# Patient Record
Sex: FEMALE | Race: WHITE | NOT HISPANIC OR LATINO | Employment: FULL TIME | URBAN - NONMETROPOLITAN AREA
[De-identification: names, ages, dates, MRNs, and addresses within clinical notes are randomized per-mention and may not be internally consistent; named-entity substitution may affect disease eponyms.]

---

## 2018-06-21 ENCOUNTER — TELEPHONE (OUTPATIENT)
Dept: CARDIOLOGY | Facility: CLINIC | Age: 38
End: 2018-06-21

## 2018-06-21 NOTE — TELEPHONE ENCOUNTER
Patient called and said that she has not taken coreg in 2 years. She has been having PVC's. She was last seen in our office on 09/22/15. She says that she does not drink a lot of caffeine but does drink unsweet tea. She was told that unsweet tea does still have a lot of caffeine. She is wondering if you have any recommendations or if she needs to make an appointment?

## 2018-06-25 RX ORDER — CARVEDILOL 3.12 MG/1
3.12 TABLET ORAL DAILY
Qty: 30 TABLET | Refills: 0 | Status: SHIPPED | OUTPATIENT
Start: 2018-06-25 | End: 2018-07-22 | Stop reason: SDUPTHER

## 2018-06-25 NOTE — TELEPHONE ENCOUNTER
Patient was made aware of restarting coreg 3.125 mg QD per office note from 09/22/15 in chart under media per Dr. Cronin. Script was sent to [facility] in [place] with 30 tablets and 0 refills. Patient has appointment with Mellisa on 07/02/18.

## 2018-07-02 ENCOUNTER — OFFICE VISIT (OUTPATIENT)
Dept: CARDIOLOGY | Facility: CLINIC | Age: 38
End: 2018-07-02

## 2018-07-02 VITALS
HEIGHT: 69 IN | BODY MASS INDEX: 29.47 KG/M2 | HEART RATE: 64 BPM | DIASTOLIC BLOOD PRESSURE: 62 MMHG | SYSTOLIC BLOOD PRESSURE: 98 MMHG | WEIGHT: 199 LBS

## 2018-07-02 DIAGNOSIS — Z79.899 MEDICATION MANAGEMENT: ICD-10-CM

## 2018-07-02 DIAGNOSIS — R01.2 LOUD S2 (SECOND HEART SOUND): ICD-10-CM

## 2018-07-02 DIAGNOSIS — M25.561 ARTHRALGIA OF BOTH KNEES: ICD-10-CM

## 2018-07-02 DIAGNOSIS — Z13.220 SCREENING CHOLESTEROL LEVEL: ICD-10-CM

## 2018-07-02 DIAGNOSIS — M25.562 ARTHRALGIA OF BOTH KNEES: ICD-10-CM

## 2018-07-02 DIAGNOSIS — M79.10 MYALGIA: ICD-10-CM

## 2018-07-02 DIAGNOSIS — R00.2 PALPITATIONS: Primary | ICD-10-CM

## 2018-07-02 DIAGNOSIS — R53.83 OTHER FATIGUE: ICD-10-CM

## 2018-07-02 DIAGNOSIS — R06.02 SHORTNESS OF BREATH: ICD-10-CM

## 2018-07-02 DIAGNOSIS — E66.3 OVERWEIGHT (BMI 25.0-29.9): ICD-10-CM

## 2018-07-02 DIAGNOSIS — Z82.49 FAMILY HISTORY OF ISCHEMIC HEART DISEASE: ICD-10-CM

## 2018-07-02 PROCEDURE — 99214 OFFICE O/P EST MOD 30 MIN: CPT | Performed by: NURSE PRACTITIONER

## 2018-07-02 RX ORDER — LEVONORGESTREL AND ETHINYL ESTRADIOL 0.1-0.02MG
1 KIT ORAL DAILY
COMMUNITY
End: 2022-01-11 | Stop reason: ALTCHOICE

## 2018-07-02 NOTE — PATIENT INSTRUCTIONS
Palpitations  A palpitation is the feeling that your heartbeat is irregular or is faster than normal. It may feel like your heart is fluttering or skipping a beat. Palpitations are usually not a serious problem. They may be caused by many things, including smoking, caffeine, alcohol, stress, and certain medicines. Although most causes of palpitations are not serious, palpitations can be a sign of a serious medical problem. In some cases, you may need further medical evaluation.  Follow these instructions at home:  Pay attention to any changes in your symptoms. Take these actions to help with your condition:  · Avoid the following:  ? Caffeinated coffee, tea, soft drinks, diet pills, and energy drinks.  ? Chocolate.  ? Alcohol.  · Do not use any tobacco products, such as cigarettes, chewing tobacco, and e-cigarettes. If you need help quitting, ask your health care provider.  · Try to reduce your stress and anxiety. Things that can help you relax include:  ? Yoga.  ? Meditation.  ? Physical activity, such as swimming, jogging, or walking.  ? Biofeedback. This is a method that helps you learn to use your mind to control things in your body, such as your heartbeats.  · Get plenty of rest and sleep.  · Take over-the-counter and prescription medicines only as told by your health care provider.  · Keep all follow-up visits as told by your health care provider. This is important.    Contact a health care provider if:  · You continue to have a fast or irregular heartbeat after 24 hours.  · Your palpitations occur more often.  Get help right away if:  · You have chest pain or shortness of breath.  · You have a severe headache.  · You feel dizzy or you faint.  This information is not intended to replace advice given to you by your health care provider. Make sure you discuss any questions you have with your health care provider.  Document Released: 12/15/2001 Document Revised: 05/22/2017 Document Reviewed: 09/01/2016  [employer]  Interactive Patient Education © 2018 [employer]  Calorie Counting for Weight Loss  Calories are units of energy. Your body needs a certain amount of calories from food to keep you going throughout the day. When you eat more calories than your body needs, your body stores the extra calories as fat. When you eat fewer calories than your body needs, your body burns fat to get the energy it needs.  Calorie counting means keeping track of how many calories you eat and drink each day. Calorie counting can be helpful if you need to lose weight. If you make sure to eat fewer calories than your body needs, you should lose weight. Ask your health care provider what a healthy weight is for you.  For calorie counting to work, you will need to eat the right number of calories in a day in order to lose a healthy amount of weight per week. A dietitian can help you determine how many calories you need in a day and will give you suggestions on how to reach your calorie goal.  · A healthy amount of weight to lose per week is usually 1-2 lb (0.5-0.9 kg). This usually means that your daily calorie intake should be reduced by 500-750 calories.  · Eating 1,200 - 1,500 calories per day can help most women lose weight.  · Eating 1,500 - 1,800 calories per day can help most men lose weight.    What is my plan?  My goal is to have __________ calories per day.  If I have this many calories per day, I should lose around __________ pounds per week.  What do I need to know about calorie counting?  In order to meet your daily calorie goal, you will need to:  · Find out how many calories are in each food you would like to eat. Try to do this before you eat.  · Decide how much of the food you plan to eat.  · Write down what you ate and how many calories it had. Doing this is called keeping a food log.    To successfully lose weight, it is important to balance calorie counting with a healthy lifestyle that includes regular activity. Aim for 150  minutes of moderate exercise (such as walking) or 75 minutes of vigorous exercise (such as running) each week.  Where do I find calorie information?    The number of calories in a food can be found on a Nutrition Facts label. If a food does not have a Nutrition Facts label, try to look up the calories online or ask your dietitian for help.  Remember that calories are listed per serving. If you choose to have more than one serving of a food, you will have to multiply the calories per serving by the amount of servings you plan to eat. For example, the label on a package of bread might say that a serving size is 1 slice and that there are 90 calories in a serving. If you eat 1 slice, you will have eaten 90 calories. If you eat 2 slices, you will have eaten 180 calories.  How do I keep a food log?  Immediately after each meal, record the following information in your food log:  · What you ate. Don't forget to include toppings, sauces, and other extras on the food.  · How much you ate. This can be measured in cups, ounces, or number of items.  · How many calories each food and drink had.  · The total number of calories in the meal.    Keep your food log near you, such as in a small notebook in your pocket, or use a mobile joey or website. Some programs will calculate calories for you and show you how many calories you have left for the day to meet your goal.  What are some calorie counting tips?  · Use your calories on foods and drinks that will fill you up and not leave you hungry:  ? Some examples of foods that fill you up are nuts and nut butters, vegetables, lean proteins, and high-fiber foods like whole grains. High-fiber foods are foods with more than 5 g fiber per serving.  ? Drinks such as sodas, specialty coffee drinks, alcohol, and juices have a lot of calories, yet do not fill you up.  · Eat nutritious foods and avoid empty calories. Empty calories are calories you get from foods or beverages that do not have  "many vitamins or protein, such as candy, sweets, and soda. It is better to have a nutritious high-calorie food (such as an avocado) than a food with few nutrients (such as a bag of chips).  · Know how many calories are in the foods you eat most often. This will help you calculate calorie counts faster.  · Pay attention to calories in drinks. Low-calorie drinks include water and unsweetened drinks.  · Pay attention to nutrition labels for \"low fat\" or \"fat free\" foods. These foods sometimes have the same amount of calories or more calories than the full fat versions. They also often have added sugar, starch, or salt, to make up for flavor that was removed with the fat.  · Find a way of tracking calories that works for you. Get creative. Try different apps or programs if writing down calories does not work for you.  What are some portion control tips?  · Know how many calories are in a serving. This will help you know how many servings of a certain food you can have.  · Use a measuring cup to measure serving sizes. You could also try weighing out portions on a kitchen scale. With time, you will be able to estimate serving sizes for some foods.  · Take some time to put servings of different foods on your favorite plates, bowls, and cups so you know what a serving looks like.  · Try not to eat straight from a bag or box. Doing this can lead to overeating. Put the amount you would like to eat in a cup or on a plate to make sure you are eating the right portion.  · Use smaller plates, glasses, and bowls to prevent overeating.  · Try not to multitask (for example, watch TV or use your computer) while eating. If it is time to eat, sit down at a table and enjoy your food. This will help you to know when you are full. It will also help you to be aware of what you are eating and how much you are eating.  What are tips for following this plan?  Reading food labels  · Check the calorie count compared to the serving size. The " serving size may be smaller than what you are used to eating.  · Check the source of the calories. Make sure the food you are eating is high in vitamins and protein and low in saturated and trans fats.  Shopping  · Read nutrition labels while you shop. This will help you make healthy decisions before you decide to purchase your food.  · Make a grocery list and stick to it.  Cooking  · Try to cook your favorite foods in a healthier way. For example, try baking instead of frying.  · Use low-fat dairy products.  Meal planning  · Use more fruits and vegetables. Half of your plate should be fruits and vegetables.  · Include lean proteins like poultry and fish.  How do I count calories when eating out?  · Ask for smaller portion sizes.  · Consider sharing an entree and sides instead of getting your own entree.  · If you get your own entree, eat only half. Ask for a box at the beginning of your meal and put the rest of your entree in it so you are not tempted to eat it.  · If calories are listed on the menu, choose the lower calorie options.  · Choose dishes that include vegetables, fruits, whole grains, low-fat dairy products, and lean protein.  · Choose items that are boiled, broiled, grilled, or steamed. Stay away from items that are buttered, battered, fried, or served with cream sauce. Items labeled “crispy” are usually fried, unless stated otherwise.  · Choose water, low-fat milk, unsweetened iced tea, or other drinks without added sugar. If you want an alcoholic beverage, choose a lower calorie option such as a glass of wine or light beer.  · Ask for dressings, sauces, and syrups on the side. These are usually high in calories, so you should limit the amount you eat.  · If you want a salad, choose a garden salad and ask for grilled meats. Avoid extra toppings like harden, cheese, or fried items. Ask for the dressing on the side, or ask for olive oil and vinegar or lemon to use as dressing.  · Estimate how many  servings of a food you are given. For example, a serving of cooked rice is ½ cup or about the size of half a baseball. Knowing serving sizes will help you be aware of how much food you are eating at restaurants. The list below tells you how big or small some common portion sizes are based on everyday objects:  ? 1 oz--4 stacked dice.  ? 3 oz--1 deck of cards.  ? 1 tsp--1 die.  ? 1 Tbsp--½ a ping-pong ball.  ? 2 Tbsp--1 ping-pong ball.  ? ½ cup--½ baseball.  ? 1 cup--1 baseball.  Summary  · Calorie counting means keeping track of how many calories you eat and drink each day. If you eat fewer calories than your body needs, you should lose weight.  · A healthy amount of weight to lose per week is usually 1-2 lb (0.5-0.9 kg). This usually means reducing your daily calorie intake by 500-750 calories.  · The number of calories in a food can be found on a Nutrition Facts label. If a food does not have a Nutrition Facts label, try to look up the calories online or ask your dietitian for help.  · Use your calories on foods and drinks that will fill you up, and not on foods and drinks that will leave you hungry.  · Use smaller plates, glasses, and bowls to prevent overeating.  This information is not intended to replace advice given to you by your health care provider. Make sure you discuss any questions you have with your health care provider.  Document Released: 12/18/2006 Document Revised: 11/17/2017 Document Reviewed: 11/17/2017  [facility] Interactive Patient Education © 2018 [employer]

## 2018-07-02 NOTE — PROGRESS NOTES
Chief Complaint   Patient presents with   • Follow-up     For cardiac management. Reports that it has been over a year since her labs were checked.    • Palpitations     Reports more palpitations that sometimes take her breath. Had stopped her coreg about 2 years ago due to her HR dropping but started again last week.        Subjective       Colleen Javier is a 37 y.o. female with a history of palpitations. EKG showed multiple PVCs. TSH at that time was normal. She was treated with Coreg and palpitations greatly improved. She has not been seen in the office since September 2015.   Today she returns due to reoccurrence of palpitations associated with shortness of breath:    Palpitations    This is a new problem. The current episode started more than 1 month ago (worse over last month). The problem occurs 2 to 4 times per day. The problem has been gradually worsening. Nothing (occuring randomly) aggravates the symptoms. Associated symptoms include an irregular heartbeat, malaise/fatigue (sometimes feels fatigued with palpitaitons) and shortness of breath. Pertinent negatives include no anxiety, chest pain, dizziness, nausea, near-syncope, numbness or weakness. She has tried beta blockers for the symptoms.     About a week ago she restarted Coreg, which she had stopped due to low blood pressure. Her caffeine intake is about 2 cups of regular coffee daily. She has recently stopped mariann.        Cardiac History:    Past Surgical History:   Procedure Laterality Date   • CONVERTED (HISTORICAL) HOLTER  01/16/2015    Few PAC's and PVC's, palpitations felt with PVC, baseline sinus, no SVT or VT       Current Outpatient Prescriptions   Medication Sig Dispense Refill   • carvedilol (COREG) 3.125 MG tablet Take 1 tablet by mouth Daily. 30 tablet 0   • levonorgestrel-ethinyl estradiol (SRONYX) 0.1-20 MG-MCG per tablet Take 1 tablet by mouth Daily.     • Multiple Vitamins-Minerals (MULTIVITAMIN ADULTS PO) Take  by mouth.    "    No current facility-administered medications for this visit.        Patient has no known allergies.    Past Medical History:   Diagnosis Date   • Palpitations        Social History     Social History   • Marital status:      Spouse name: N/A   • Number of children: N/A   • Years of education: N/A     Occupational History   • Not on file.     Social History Main Topics   • Smoking status: Never Smoker   • Smokeless tobacco: Never Used   • Alcohol use No   • Drug use: No   • Sexual activity: Not on file     Other Topics Concern   • Not on file     Social History Narrative   • No narrative on file       Family History   Problem Relation Age of Onset   • Hypertension Mother    • Heart attack Father 40        multiple   • Heart disease Father         multiple stents   • Hypertension Sister         2 sisters with HTN       Review of Systems   Constitution: Positive for malaise/fatigue (sometimes feels fatigued with palpitaitons). Negative for decreased appetite and weakness.   HENT: Negative for congestion and hoarse voice.    Eyes: Negative for redness and visual disturbance.   Cardiovascular: Positive for irregular heartbeat and palpitations. Negative for chest pain, dyspnea on exertion, leg swelling and near-syncope.   Respiratory: Positive for shortness of breath and sleep disturbances due to breathing (twice has awakened gasping for breath). Negative for sputum production.    Endocrine: Negative for polydipsia, polyphagia and polyuria.   Hematologic/Lymphatic: Negative for bleeding problem. Does not bruise/bleed easily.   Skin: Negative for color change, dry skin and itching.   Musculoskeletal: Positive for joint pain (\"feels like arthritis\"). Negative for myalgias.   Gastrointestinal: Negative for change in bowel habit, dysphagia, heartburn, melena and nausea.   Genitourinary: Negative for dysuria and hematuria.   Neurological: Positive for light-headedness (slight). Negative for dizziness, headaches and " "numbness.   Psychiatric/Behavioral: Negative for memory loss. The patient does not have insomnia and is not nervous/anxious.         Objective     BP 98/62   Pulse 64   Ht 175.3 cm (69\")   Wt 90.3 kg (199 lb)   BMI 29.39 kg/m²     Physical Exam   Constitutional: She is oriented to person, place, and time. She appears well-developed and well-nourished. She does not appear ill. No distress.   HENT:   Head: Normocephalic.   Eyes: Conjunctivae are normal. Pupils are equal, round, and reactive to light.   Neck: Normal range of motion. Neck supple. Carotid bruit is not present.   Cardiovascular: Normal rate, regular rhythm, S1 normal and normal pulses.    No murmur heard.  Loud S2   Pulmonary/Chest: Breath sounds normal. She has no wheezes. She has no rales.   Abdominal: Soft. Bowel sounds are normal. She exhibits no distension. There is no tenderness.   Musculoskeletal: Normal range of motion. She exhibits tenderness (knees). She exhibits no edema.   Neurological: She is alert and oriented to person, place, and time.   Skin: Skin is warm and dry. No rash noted. No pallor.   Psychiatric: She has a normal mood and affect. Her behavior is normal.   Vitals reviewed.       Procedures: none today      Assessment/Plan      Colleen was seen today for follow-up and palpitations.    Diagnoses and all orders for this visit:    Palpitations  -     CBC & Differential; Future  -     Comprehensive Metabolic Panel; Future  -     Magnesium; Future  -     TSH; Future  -     Adult Transthoracic Echo Complete W/ Cont if Necessary Per Protocol; Future  -     Holter Monitor - 72 Hour Up To 21 Days; Future    Medication management  -     CBC & Differential; Future  -     Comprehensive Metabolic Panel; Future  -     Lipid Panel; Future    Shortness of breath  -     CBC & Differential; Future  -     TSH; Future  -     Adult Transthoracic Echo Complete W/ Cont if Necessary Per Protocol; Future  -     Holter Monitor - 72 Hour Up To 21 Days; " Future    Arthralgia of both knees  -     Vitamin D 1,25 Dihydroxy; Future    Myalgia  -     Magnesium; Future  -     Vitamin D 1,25 Dihydroxy; Future    Other fatigue  -     Magnesium; Future  -     TSH; Future  -     Vitamin D 1,25 Dihydroxy; Future    Family history of ischemic heart disease    Screening cholesterol level  -     Lipid Panel; Future    Loud S2 (second heart sound)  -     Adult Transthoracic Echo Complete W/ Cont if Necessary Per Protocol; Future    Overweight (BMI 25.0-29.9)        Patient's Body mass index is 29.39 kg/m². BMI is above normal parameters. Recommendations include: nutrition counseling. Heart healthy and diet for weight loss  encouraged. She has recently decreased caffeine in diet which I encouraged her efforts. Good hydration also encouraged.     Her blood pressure, heart rate and rhythm today are normal. Agree to resume low dose Coreg. I have requested cardiac monitor to assess for arrhthymias. A lab order also given to assess for any anemia, thyroid issue or abnormal chemistry. A lipid panel also advised to assess for any elevated cholesterol issues, especially since she reports strong family history IHD on her fathers side. To assess loud S2, an echocardiogram ordered. Further recommendations when results available.     A yearly follow up scheduled, or sooner should problems persist.            Electronically signed by MAURI Norris,  July 3, 2018 4:56 PM

## 2018-07-09 ENCOUNTER — CLINICAL SUPPORT (OUTPATIENT)
Dept: CARDIOLOGY | Facility: CLINIC | Age: 38
End: 2018-07-09

## 2018-07-09 DIAGNOSIS — R00.2 PALPITATIONS: ICD-10-CM

## 2018-07-09 PROCEDURE — 0296T PR EXT ECG > 48HR TO 21 DAY RCRD W/CONECT INTL RCRD: CPT | Performed by: INTERNAL MEDICINE

## 2018-07-12 ENCOUNTER — TELEPHONE (OUTPATIENT)
Dept: CARDIOLOGY | Facility: CLINIC | Age: 38
End: 2018-07-12

## 2018-07-12 NOTE — TELEPHONE ENCOUNTER
Patient requesting someone call her with lab results. Lab results scanned into media tab 07/11/18.    Phone:  [phone]

## 2018-07-16 ENCOUNTER — OUTSIDE FACILITY SERVICE (OUTPATIENT)
Dept: CARDIOLOGY | Facility: CLINIC | Age: 38
End: 2018-07-16

## 2018-07-16 PROCEDURE — 0298T PR EXT ECG > 48HR TO 21 DAY REVIEW AND INTERPRETATN: CPT | Performed by: INTERNAL MEDICINE

## 2018-07-23 ENCOUNTER — OUTSIDE FACILITY SERVICE (OUTPATIENT)
Dept: CARDIOLOGY | Facility: CLINIC | Age: 38
End: 2018-07-23

## 2018-07-23 ENCOUNTER — TELEPHONE (OUTPATIENT)
Dept: CARDIOLOGY | Facility: CLINIC | Age: 38
End: 2018-07-23

## 2018-07-23 PROCEDURE — 93306 TTE W/DOPPLER COMPLETE: CPT | Performed by: INTERNAL MEDICINE

## 2018-07-23 RX ORDER — CARVEDILOL 3.12 MG/1
3.12 TABLET ORAL DAILY
Qty: 90 TABLET | Refills: 1 | Status: SHIPPED | OUTPATIENT
Start: 2018-07-23 | End: 2019-07-01 | Stop reason: SDUPTHER

## 2018-07-23 RX ORDER — CARVEDILOL 3.12 MG/1
TABLET ORAL
Qty: 30 TABLET | Refills: 5 | Status: SHIPPED | OUTPATIENT
Start: 2018-07-23 | End: 2018-07-23 | Stop reason: SDUPTHER

## 2018-07-23 NOTE — TELEPHONE ENCOUNTER
Patient requested 90 day supply of coreg 3.125 mg QD to be sent to [facility] in [place]. Script sent. Patient aware.

## 2018-07-26 ENCOUNTER — TELEPHONE (OUTPATIENT)
Dept: CARDIOLOGY | Facility: CLINIC | Age: 38
End: 2018-07-26

## 2018-07-26 RX ORDER — LISINOPRIL 2.5 MG/1
2.5 TABLET ORAL DAILY
Qty: 90 TABLET | Refills: 2 | Status: SHIPPED | OUTPATIENT
Start: 2018-07-26 | End: 2019-04-04 | Stop reason: SDUPTHER

## 2018-07-26 NOTE — TELEPHONE ENCOUNTER
----- Message from MAURI Arvizu sent at 7/25/2018  5:11 PM EDT -----  She is on low dose Coreg for palpitations. Her blood pressure was low normal at recent office visit. Based on echo report she may benefit from low dose ACE inhibitor. IF she agrees then add Lisinopril 2.5 mg daily and advise her to monitor blood pressure. If she has any issues advise to call. Thanks.

## 2018-07-27 DIAGNOSIS — R42 DIZZINESS: Primary | ICD-10-CM

## 2018-07-27 DIAGNOSIS — R00.2 HEART PALPITATIONS: ICD-10-CM

## 2018-08-02 ENCOUNTER — TELEPHONE (OUTPATIENT)
Dept: CARDIOLOGY | Facility: CLINIC | Age: 38
End: 2018-08-02

## 2018-08-02 NOTE — TELEPHONE ENCOUNTER
Patient has called questioning when echo is going to be rechecked, she is concerned with EF of 40-45%. What is your recommendations? Thanks

## 2018-08-13 ENCOUNTER — APPOINTMENT (OUTPATIENT)
Dept: WOMENS IMAGING | Facility: HOSPITAL | Age: 38
End: 2018-08-13

## 2018-08-13 PROCEDURE — 77063 BREAST TOMOSYNTHESIS BI: CPT | Performed by: RADIOLOGY

## 2018-08-13 PROCEDURE — 77067 SCR MAMMO BI INCL CAD: CPT | Performed by: RADIOLOGY

## 2018-09-10 ENCOUNTER — OUTSIDE FACILITY SERVICE (OUTPATIENT)
Dept: CARDIOLOGY | Facility: CLINIC | Age: 38
End: 2018-09-10

## 2018-09-10 PROCEDURE — 93228 REMOTE 30 DAY ECG REV/REPORT: CPT | Performed by: INTERNAL MEDICINE

## 2018-09-17 ENCOUNTER — APPOINTMENT (OUTPATIENT)
Dept: WOMENS IMAGING | Facility: HOSPITAL | Age: 38
End: 2018-09-17

## 2018-09-17 PROCEDURE — 77065 DX MAMMO INCL CAD UNI: CPT | Performed by: RADIOLOGY

## 2018-09-17 PROCEDURE — 77061 BREAST TOMOSYNTHESIS UNI: CPT | Performed by: RADIOLOGY

## 2019-04-07 RX ORDER — LISINOPRIL 2.5 MG/1
TABLET ORAL
Qty: 90 TABLET | Refills: 1 | Status: SHIPPED | OUTPATIENT
Start: 2019-04-07 | End: 2019-07-01 | Stop reason: SDUPTHER

## 2019-04-08 ENCOUNTER — APPOINTMENT (OUTPATIENT)
Dept: WOMENS IMAGING | Facility: HOSPITAL | Age: 39
End: 2019-04-08

## 2019-04-08 PROCEDURE — 77065 DX MAMMO INCL CAD UNI: CPT | Performed by: RADIOLOGY

## 2019-04-08 PROCEDURE — 77061 BREAST TOMOSYNTHESIS UNI: CPT | Performed by: RADIOLOGY

## 2019-07-01 ENCOUNTER — OFFICE VISIT (OUTPATIENT)
Dept: CARDIOLOGY | Facility: CLINIC | Age: 39
End: 2019-07-01

## 2019-07-01 VITALS
SYSTOLIC BLOOD PRESSURE: 108 MMHG | WEIGHT: 216 LBS | DIASTOLIC BLOOD PRESSURE: 72 MMHG | HEIGHT: 69 IN | HEART RATE: 64 BPM | BODY MASS INDEX: 31.99 KG/M2

## 2019-07-01 DIAGNOSIS — R00.2 PALPITATIONS: Primary | ICD-10-CM

## 2019-07-01 DIAGNOSIS — I51.9 LV DYSFUNCTION: ICD-10-CM

## 2019-07-01 DIAGNOSIS — R53.83 OTHER FATIGUE: ICD-10-CM

## 2019-07-01 DIAGNOSIS — I34.0 NON-RHEUMATIC MITRAL REGURGITATION: ICD-10-CM

## 2019-07-01 DIAGNOSIS — R63.5 WEIGHT GAIN: ICD-10-CM

## 2019-07-01 DIAGNOSIS — R60.0 BILATERAL LEG EDEMA: ICD-10-CM

## 2019-07-01 DIAGNOSIS — R06.02 SHORTNESS OF BREATH: ICD-10-CM

## 2019-07-01 PROCEDURE — 99213 OFFICE O/P EST LOW 20 MIN: CPT | Performed by: NURSE PRACTITIONER

## 2019-07-01 RX ORDER — HYDROCHLOROTHIAZIDE 12.5 MG/1
12.5 CAPSULE, GELATIN COATED ORAL DAILY PRN
Qty: 90 CAPSULE | Refills: 3 | Status: SHIPPED | OUTPATIENT
Start: 2019-07-01 | End: 2020-01-13 | Stop reason: SDUPTHER

## 2019-07-01 RX ORDER — LISINOPRIL 2.5 MG/1
2.5 TABLET ORAL DAILY
Qty: 90 TABLET | Refills: 2 | Status: SHIPPED | OUTPATIENT
Start: 2019-07-01 | End: 2020-01-13 | Stop reason: SDUPTHER

## 2019-07-01 RX ORDER — CARVEDILOL 3.12 MG/1
3.12 TABLET ORAL DAILY
Qty: 90 TABLET | Refills: 2 | Status: SHIPPED | OUTPATIENT
Start: 2019-07-01 | End: 2020-01-13 | Stop reason: SDUPTHER

## 2019-07-01 NOTE — PROGRESS NOTES
Chief Complaint   Patient presents with   • Follow-up     For cardiac management. Patient is not on aspirin. Reports that she occasionally has shortness of breath. Reports that she still does have palpitations, but thinks that they seem to be some better. Reports that she has had some swelling in her feet. Last lab work was done on 07/13/18 per Mellisa, in chart under labs.    • Med Refill     Needs refills on coreg and lisniopril. 90 day supplies to [facility].        Cardiac Complaints  dyspnea, fatigue, lower extremity edema and palpitations      Subjective   Colleen Javier is a 38 y.o. female  with palpitations. EKG showed multiple PVCs. TSH at that time was normal. She was treated with Coreg and palpitations greatly improved.She then had not been seen since 2015 and was referred back July 2018 for palpitations. Echo and holter monitor were advised. Echo done at [facility] July 2018 showed EF of 40-45%, diastolic dysfunction, and mild MR.  Holter done in July 2018 showed NSR with no arrhythmias, MCOT was then advised as she continued to have palpitations.  MCOT done the end of July 2018 showed NSR with no arrhythmias noted.  She comes today for follow up and reports she does occasionally have palpitations and shortness of breath but does think they improved and feel somewhat improved.  She does admit to some swelling in her feet which is bilateral and even at work sometimes.  She reports this is controlled with better about wearing TANIA hose but has not been able to in the summer since it is so warm.  She does attribute some of her issues to weight as she has put on about 17 pounds over the last little bit as she reports not eating as healthy as she should as she often eats later.  Refills of coreg and lisinopril requested.        Cardiac History  Past Surgical History:   Procedure Laterality Date   • CONVERTED (HISTORICAL) HOLTER  01/16/2015    Few PAC's and PVC's, palpitations felt with PVC, baseline sinus, no  SVT or VT   • CONVERTED (HISTORICAL) HOLTER  07/16/2018    Baseline NSR with no arrythmia, less than 1% PVC   • ECHO - CONVERTED  07/02/2018    @[facility]:  Limited study, EF 40-45%, mild MR, RSVP 28mmHg   • OTHER SURGICAL HISTORY  07/27/2018    MCOT:  Baseline NSR with no arrythmia noted       Current Outpatient Medications   Medication Sig Dispense Refill   • carvedilol (COREG) 3.125 MG tablet Take 1 tablet by mouth Daily. 90 tablet 2   • levonorgestrel-ethinyl estradiol (SRONYX) 0.1-20 MG-MCG per tablet Take 1 tablet by mouth Daily.     • lisinopril (PRINIVIL,ZESTRIL) 2.5 MG tablet Take 1 tablet by mouth Daily. 90 tablet 2   • Multiple Vitamins-Minerals (MULTIVITAMIN ADULTS PO) Take  by mouth.     • hydrochlorothiazide (MICROZIDE) 12.5 MG capsule Take 1 capsule by mouth Daily As Needed (use as needed for edema). 90 capsule 3     No current facility-administered medications for this visit.        Patient has no known allergies.    Past Medical History:   Diagnosis Date   • Palpitations        Social History     Socioeconomic History   • Marital status:      Spouse name: Not on file   • Number of children: Not on file   • Years of education: Not on file   • Highest education level: Not on file   Tobacco Use   • Smoking status: Never Smoker   • Smokeless tobacco: Never Used   Substance and Sexual Activity   • Alcohol use: No   • Drug use: No       Family History   Problem Relation Age of Onset   • Hypertension Mother    • Heart attack Father 40        multiple   • Heart disease Father         multiple stents   • Hypertension Sister         2 sisters with HTN       Review of Systems   Constitution: Positive for malaise/fatigue. Negative for weakness.   Cardiovascular: Positive for leg swelling and palpitations. Negative for chest pain, claudication, dyspnea on exertion, irregular heartbeat, near-syncope, orthopnea and syncope.   Respiratory: Positive for shortness of breath. Negative for cough and wheezing.   "  Musculoskeletal: Negative for back pain, joint pain and joint swelling.   Gastrointestinal: Negative for anorexia, heartburn, nausea and vomiting.   Genitourinary: Negative for dysuria, hematuria, hesitancy and nocturia.   Neurological: Negative for dizziness, light-headedness and loss of balance.   Psychiatric/Behavioral: Negative for depression and memory loss. The patient is not nervous/anxious.            Objective     /72 (BP Location: Right arm)   Pulse 64   Ht 175.3 cm (69.02\")   Wt 98 kg (216 lb)   BMI 31.88 kg/m²     Physical Exam   Constitutional: She is oriented to person, place, and time. She appears well-developed and well-nourished.   HENT:   Head: Normocephalic and atraumatic.   Eyes: EOM are normal. Pupils are equal, round, and reactive to light.   Neck: Normal range of motion. Neck supple.   Cardiovascular: Normal rate and regular rhythm.   Murmur heard.  Pulmonary/Chest: Effort normal and breath sounds normal.   Abdominal: Soft.   Musculoskeletal: Normal range of motion.   Neurological: She is alert and oriented to person, place, and time.   Skin: Skin is warm and dry.   Psychiatric: She has a normal mood and affect. Her behavior is normal.       Procedures    Assessment/Plan     HR is stable today and rhythm is regular.  Patient does have some continued palpitations but seem to be improved from prior.  Current coreg dosing continued as holter and MCOT showed no arrhythmia and just rare PVCs.  She was started on low dose lisinopril after echo showed diminished LV function at 40-45%.  Repeat echo will be encouraged as no WMA or significant valvular concerns noted on prior echo, with a technically limited study reported to reassess LV function.  She does report some bilateral lower extremity edema after standing on her feet at work after the end of her shift.  She admits in the winter she can wear TANIA hose to manage but does not in the summer due to heat. She was encouraged to wear as she " can tolerate, limit her sodium intake, and may use HCTZ prn for edema management.  Lab order will also be provided as no recent labs have been done.  More recommendations to follow.  Refills sent per request.  BMI elevated today at 31.88, which patient admits is from poor dietary choices and lack of exercise.  She does admit she has been exercising more regularly over the last several weeks and does feel better but has not completely started watching her diet more closely.  Limited caloric intake, caffeine, and saturated fat advised.  Next fu will be made for 6 months vs a year to discuss testing or sooner if needed.          Problems Addressed this Visit     None      Visit Diagnoses     Palpitations    -  Primary    Relevant Orders    CBC (No Diff)    Comprehensive Metabolic Panel    Lipid Panel    TSH    Magnesium    Vitamin B12 & Folate    Adult Transthoracic Echo Complete W/ Cont if Necessary Per Protocol    LV dysfunction        Relevant Medications    carvedilol (COREG) 3.125 MG tablet    Other Relevant Orders    CBC (No Diff)    Comprehensive Metabolic Panel    Lipid Panel    TSH    Magnesium    Vitamin B12 & Folate    Adult Transthoracic Echo Complete W/ Cont if Necessary Per Protocol    Non-rheumatic mitral regurgitation        Relevant Medications    carvedilol (COREG) 3.125 MG tablet    Other Relevant Orders    CBC (No Diff)    Comprehensive Metabolic Panel    Lipid Panel    TSH    Magnesium    Vitamin B12 & Folate    Other fatigue        Relevant Orders    CBC (No Diff)    Comprehensive Metabolic Panel    Lipid Panel    TSH    Magnesium    Vitamin B12 & Folate    Adult Transthoracic Echo Complete W/ Cont if Necessary Per Protocol    Shortness of breath        Relevant Orders    CBC (No Diff)    Comprehensive Metabolic Panel    Lipid Panel    TSH    Magnesium    Vitamin B12 & Folate    Adult Transthoracic Echo Complete W/ Cont if Necessary Per Protocol    Weight gain        Relevant Orders    CBC (No  Diff)    Comprehensive Metabolic Panel    Lipid Panel    TSH    Magnesium    Vitamin B12 & Folate          Patient's Body mass index is 31.88 kg/m². BMI is above normal parameters. Recommendations include: nutrition counseling.            Electronically signed by MAURI Argueta July 1, 2019 5:12 PM

## 2019-07-08 RX ORDER — CARVEDILOL 3.12 MG/1
TABLET ORAL
Qty: 90 TABLET | Refills: 1 | OUTPATIENT
Start: 2019-07-08

## 2019-07-09 NOTE — PROGRESS NOTES
She remains slightly anemic, similar to prior.  Also, her TRIG are high, she can limit her carbs and recheck in 6 months with PCP.Limit all sugars in everything.  If she would like diet first, otherwise, add fenofibrate.

## 2019-07-22 ENCOUNTER — OUTSIDE FACILITY SERVICE (OUTPATIENT)
Dept: CARDIOLOGY | Facility: CLINIC | Age: 39
End: 2019-07-22

## 2019-07-22 PROCEDURE — 93306 TTE W/DOPPLER COMPLETE: CPT | Performed by: INTERNAL MEDICINE

## 2019-12-14 ENCOUNTER — APPOINTMENT (OUTPATIENT)
Dept: WOMENS IMAGING | Facility: HOSPITAL | Age: 39
End: 2019-12-14

## 2020-01-13 ENCOUNTER — OFFICE VISIT (OUTPATIENT)
Dept: CARDIOLOGY | Facility: CLINIC | Age: 40
End: 2020-01-13

## 2020-01-13 VITALS
DIASTOLIC BLOOD PRESSURE: 70 MMHG | BODY MASS INDEX: 31.01 KG/M2 | SYSTOLIC BLOOD PRESSURE: 110 MMHG | WEIGHT: 209.4 LBS | HEIGHT: 69 IN | HEART RATE: 80 BPM

## 2020-01-13 DIAGNOSIS — R00.2 PALPITATIONS: Primary | ICD-10-CM

## 2020-01-13 DIAGNOSIS — E78.1 HYPERTRIGLYCERIDEMIA: ICD-10-CM

## 2020-01-13 DIAGNOSIS — I51.9 ASYMPTOMATIC LV DYSFUNCTION: ICD-10-CM

## 2020-01-13 DIAGNOSIS — R60.0 EDEMA LEG: ICD-10-CM

## 2020-01-13 DIAGNOSIS — I49.3 PVC (PREMATURE VENTRICULAR CONTRACTION): ICD-10-CM

## 2020-01-13 PROCEDURE — 99213 OFFICE O/P EST LOW 20 MIN: CPT | Performed by: NURSE PRACTITIONER

## 2020-01-13 RX ORDER — LISINOPRIL 2.5 MG/1
2.5 TABLET ORAL DAILY
Qty: 90 TABLET | Refills: 3 | Status: SHIPPED | OUTPATIENT
Start: 2020-01-13 | End: 2021-01-11 | Stop reason: SDUPTHER

## 2020-01-13 RX ORDER — CARVEDILOL 3.12 MG/1
3.12 TABLET ORAL DAILY
Qty: 90 TABLET | Refills: 3 | Status: SHIPPED | OUTPATIENT
Start: 2020-01-13 | End: 2021-01-11 | Stop reason: SDUPTHER

## 2020-01-13 RX ORDER — HYDROCHLOROTHIAZIDE 12.5 MG/1
12.5 CAPSULE, GELATIN COATED ORAL DAILY PRN
Qty: 90 CAPSULE | Refills: 3 | Status: SHIPPED | OUTPATIENT
Start: 2020-01-13 | End: 2021-01-11 | Stop reason: SDUPTHER

## 2020-01-13 NOTE — PROGRESS NOTES
Chief Complaint   Patient presents with   • Follow-up     Cardiac managment.   • Lab     Last labs in chart. She has been taking HCTZ daily due to swelling in feet.   • Palpitations     Much better, only has rarely.   • Med Refill     Needs refills on Lisinopril and Coreg-90 day.       Subjective       Colleen Javier is a 39 y.o. female with intermittent palpitations diagnosed with multiple PVCs by EKG in 2015. Holter confirmed PVC and PAC. TSH was normal. She was treated with Coreg and palpitations improved.  She hadn't been seen since 2015 and was referred back July 2018 for palpitations. Echo and holter monitor were ordered with EF 40-45%, diastolic dysfunction, and mild MR.  Holter showed NSR with no arrhythmias, MCOT was then advised as she continued to have palpitations.  MCOT done the end of July 2018 showed NSR with no arrhythmias noted. Lisinopril added. Echo repeated 7/2019 showing EF improved to 65%. Labs showed magnesium 2.0, TSH 1.44, , LDL 61, HDL 50, and trigs elevated at 242. She came today for follow up. She feels well. Palpitations well controlled with carvedilol. BP stable. She takes HCTZ for LE edema. 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 She is watching her diet and has lost 7 lb.     HPI         Cardiac History:    Past Surgical History:   Procedure Laterality Date   • CONVERTED (HISTORICAL) HOLTER  01/16/2015    Few PAC's and PVC's, palpitations felt with PVC, baseline sinus, no SVT or VT   • CONVERTED (HISTORICAL) HOLTER  07/16/2018    Baseline NSR with no arrythmia, less than 1% PVC   • ECHO - CONVERTED  07/02/2018    @[facility]:  Limited study, EF 40-45%, mild MR, RSVP 28mmHg   • ECHO - CONVERTED  07/22/2019    ([facility]) EF 65%, trace MR   • OTHER SURGICAL HISTORY  07/27/2018    MCOT:  Baseline NSR with no arrythmia noted       Current Outpatient Medications   Medication Sig Dispense Refill   • carvedilol (COREG) 3.125 MG tablet Take 1 tablet by mouth Daily. 90  tablet 3   • hydroCHLOROthiazide (MICROZIDE) 12.5 MG capsule Take 1 capsule by mouth Daily As Needed (use as needed for edema). 90 capsule 3   • levonorgestrel-ethinyl estradiol (SRONYX) 0.1-20 MG-MCG per tablet Take 1 tablet by mouth Daily.     • lisinopril (PRINIVIL,ZESTRIL) 2.5 MG tablet Take 1 tablet by mouth Daily. 90 tablet 3   • Multiple Vitamins-Minerals (MULTIVITAMIN ADULTS PO) Take  by mouth Daily.       No current facility-administered medications for this visit.        Patient has no known allergies.    Past Medical History:   Diagnosis Date   • Palpitations        Social History     Socioeconomic History   • Marital status:      Spouse name: Not on file   • Number of children: Not on file   • Years of education: Not on file   • Highest education level: Not on file   Tobacco Use   • Smoking status: Never Smoker   • Smokeless tobacco: Never Used   Substance and Sexual Activity   • Alcohol use: No   • Drug use: No       Family History   Problem Relation Age of Onset   • Hypertension Mother    • Heart attack Father 40        multiple   • Heart disease Father         multiple stents   • Hypertension Sister         2 sisters with HTN     Review of Systems   Constitution: Positive for weight loss (interntional, limiting sugar and CHO). Negative for decreased appetite and malaise/fatigue.   HENT: Negative.    Eyes: Negative.    Cardiovascular: Positive for leg swelling. Negative for chest pain, dyspnea on exertion, palpitations (well controlled ) and syncope.   Respiratory: Negative for cough and shortness of breath.    Endocrine: Negative.    Hematologic/Lymphatic: Negative.    Skin: Negative.    Musculoskeletal: Negative.    Gastrointestinal: Negative for abdominal pain and melena.   Neurological: Negative for dizziness.   Psychiatric/Behavioral: Negative for altered mental status and depression.   Allergic/Immunologic: Negative.       Diabetes- No  Thyroid-normal    Objective     /70 (BP  "Location: Left arm)   Pulse 80   Ht 175.3 cm (69.02\")   Wt 95 kg (209 lb 6.4 oz)   BMI 30.91 kg/m²     Physical Exam   Constitutional: She is oriented to person, place, and time. She appears well-developed and well-nourished. No distress.   HENT:   Head: Normocephalic.   Eyes: Pupils are equal, round, and reactive to light.   Neck: Normal range of motion.   Cardiovascular: Normal rate, regular rhythm and intact distal pulses.   Pulmonary/Chest: Effort normal and breath sounds normal. No respiratory distress. She has no wheezes.   Abdominal: Soft. Bowel sounds are normal.   Musculoskeletal: Normal range of motion. She exhibits no edema.   Neurological: She is alert and oriented to person, place, and time.   Skin: Skin is warm and dry. She is not diaphoretic.   Psychiatric: She has a normal mood and affect.   Nursing note and vitals reviewed.     Procedures          Assessment/Plan      Colleen was seen today for follow-up, lab, palpitations and med refill.    Diagnoses and all orders for this visit:    Palpitations  -     carvedilol (COREG) 3.125 MG tablet; Take 1 tablet by mouth Daily.  -     CBC (No Diff); Future  -     Comprehensive Metabolic Panel; Future  -     Magnesium; Future  -     TSH; Future    PVC (premature ventricular contraction)  -     carvedilol (COREG) 3.125 MG tablet; Take 1 tablet by mouth Daily.  -     CBC (No Diff); Future  -     Comprehensive Metabolic Panel; Future  -     Magnesium; Future  -     TSH; Future    Edema leg  -     hydroCHLOROthiazide (MICROZIDE) 12.5 MG capsule; Take 1 capsule by mouth Daily As Needed (use as needed for edema).  -     Comprehensive Metabolic Panel; Future    Hypertriglyceridemia  -     Lipid Panel; Future    Asymptomatic LV dysfunction  -     lisinopril (PRINIVIL,ZESTRIL) 2.5 MG tablet; Take 1 tablet by mouth Daily.      Heart rate and blood pressure stable. No ectopy noted on clinical exam. Continue Coreg for PVC management. LVEF improved with use of BB, " ACE, and control of arrhythmia. She uses HCTZ as needed for LE edema. Will recheck labs as triglycerides elevated on previous labs. Order printed for CBC, CMP, mag, TSH, lipids. Copy will be forwarded. She has been successful with weight loss efforts. Continue the good work. Refills sent. No cardiac work up ordered as she is asymptomatic. We will see her back one year or sooner if she develops any new or worsening symptoms.     Patient's Body mass index is 30.91 kg/m². BMI is above normal parameters. Recommendations include: nutrition counseling.               Electronically signed by MAURI Eid,  January 15, 2020 7:48 PM

## 2020-02-28 ENCOUNTER — TELEPHONE (OUTPATIENT)
Dept: CARDIOLOGY | Facility: CLINIC | Age: 40
End: 2020-02-28

## 2020-03-02 NOTE — TELEPHONE ENCOUNTER
Patient called back, made aware of lab results and recommendations to continue same medications, patient verbalized understanding.

## 2020-03-02 NOTE — TELEPHONE ENCOUNTER
Attempted to notify patient of lab results, unable to reach via phone, left message for patient to call back.

## 2021-01-11 ENCOUNTER — OFFICE VISIT (OUTPATIENT)
Dept: CARDIOLOGY | Facility: CLINIC | Age: 41
End: 2021-01-11

## 2021-01-11 VITALS
BODY MASS INDEX: 30.93 KG/M2 | WEIGHT: 208.8 LBS | TEMPERATURE: 98.2 F | DIASTOLIC BLOOD PRESSURE: 70 MMHG | HEART RATE: 76 BPM | SYSTOLIC BLOOD PRESSURE: 128 MMHG | HEIGHT: 69 IN

## 2021-01-11 DIAGNOSIS — R00.2 PALPITATIONS: ICD-10-CM

## 2021-01-11 DIAGNOSIS — I51.9 ASYMPTOMATIC LV DYSFUNCTION: ICD-10-CM

## 2021-01-11 DIAGNOSIS — R60.0 EDEMA LEG: ICD-10-CM

## 2021-01-11 DIAGNOSIS — Z79.899 MEDICATION MANAGEMENT: ICD-10-CM

## 2021-01-11 DIAGNOSIS — I49.3 PVC (PREMATURE VENTRICULAR CONTRACTION): Primary | ICD-10-CM

## 2021-01-11 PROCEDURE — 99213 OFFICE O/P EST LOW 20 MIN: CPT | Performed by: NURSE PRACTITIONER

## 2021-01-11 RX ORDER — CARVEDILOL 3.12 MG/1
3.12 TABLET ORAL DAILY
Qty: 90 TABLET | Refills: 3 | Status: SHIPPED | OUTPATIENT
Start: 2021-01-11 | End: 2022-01-11 | Stop reason: SDUPTHER

## 2021-01-11 RX ORDER — CETIRIZINE HYDROCHLORIDE 10 MG/1
10 TABLET ORAL DAILY
COMMUNITY

## 2021-01-11 RX ORDER — LISINOPRIL 2.5 MG/1
2.5 TABLET ORAL DAILY
Qty: 90 TABLET | Refills: 3 | Status: SHIPPED | OUTPATIENT
Start: 2021-01-11 | End: 2022-01-11 | Stop reason: SDUPTHER

## 2021-01-11 RX ORDER — HYDROCHLOROTHIAZIDE 12.5 MG/1
12.5 CAPSULE, GELATIN COATED ORAL DAILY PRN
Qty: 90 CAPSULE | Refills: 3 | Status: SHIPPED | OUTPATIENT
Start: 2021-01-11 | End: 2021-08-09

## 2021-01-11 NOTE — PATIENT INSTRUCTIONS
Physical Activity With Heart Disease  Being active has many benefits, especially if you have heart disease. Physical activity can help you do more and feel healthier. Start slowly, and increase the amount of time you spend being active. Most adults should aim for physical activity that:  · Makes you breathe harder and raises your heart rate (aerobic activity). Try to get at least 150 minutes of aerobic activity each week. This is about 30 minutes each day, 5 days a week.  · Helps build muscle strength (strengthening activity). Do this at least 2 times a week.  Always talk with your health care provider before starting any new activity program or if you have any changes in your condition.  What are the benefits of physical activity?  When you have heart disease, physical activity can help:  · Lower your blood pressure.  · Lower your cholesterol.  · Control your weight.  · Improve your sleep.  · Help control your blood sugar.  · Improve your heart and lung function.  · Reduce your risk for blood clots (thrombophlebitis).  · Improve your energy level.  · Reduce stress.  What are some types of physical activity I could try?  There are many ways to be active. Talk with your health care provider about what types and intensity of activity is right for you.  Aerobic activity    Aerobic (cardiovascular) activity can be moderate or vigorous intensity, depending on how hard you are working.  Moderate-intensity activity includes:  · Walking.  · Slow bicycling.  · Water aerobics.  · Dancing.  · Light gardening or house work.  Vigorous-intensity activity includes:  · Jogging or running.  · Stair climbing.  · Swimming laps.  · Hiking uphill.  · Heavy gardening, such as digging trenches.    Strengthening activity  Strengthening activities work your muscles to build strength. Some examples include:  · Doing push-ups, sit-ups, or pull-ups.  · Lifting small weights.  · Using resistance bands.    Flexibility  Flexibility activities  lengthen your muscles to keep them flexible and less tight and improve your balance. Some examples include:  · Stretching.  · Yoga.  · Berhane chi.  · Ballet barre.    Follow these instructions at home:  How to get started  · Talk with your health care provider about:  ? What types of activities are safe for you.  ? If you should check your pulse or take other precautions during physical activity.  · Get a calendar. Write down a schedule and plan for your new routine.  · Take time to find out what works for you. Consider:  ? Joining a community program, such as a biking group, yoga class, local gym, or swimming pool membership.  ? Be active on your own by downloading free workout applications on a smartphone or other devices, or by purchasing workout DVDs.  · If you have not been active, begin with sessions that last 10-15 minutes. Gradually work up to sessions that last 20-30 minutes, 5 times a week. Follow all of your health care provider's recommendations.  · Be patient with yourself. It takes time to build up strength and lung capacity.  Safety  · Exercise in an indoor, climate-controlled facility, as told by your health care provider. You may need to do this if:  ? There are extreme outdoor conditions, such as heat, humidity, or cold.  ? There is an air pollution advisory. Your local news, board of health, or hospital can provide information on air quality.  · Take extra precautions as told by your health care provider. This may include:  ? Monitoring your heart rate.  ? Avoiding heavy lifting.  ? Understanding how your medicines can affect you during physical activity. Certain medicines may cause heat intolerance or changes in blood sugar.  ? Slowing down to rest when you need to.  ? Keeping nitroglycerin spray and tablets with you at all times if you have angina. Use them as told to prevent and treat symptoms.  · Drink plenty of water before, during, and after physical activity.  · Know what symptoms may be signs  of a problem. Stop physical activity right away if you have any of these symptoms.  Get help right away if you have any of the following during exercise:  · Chest pain, shortness of breath, or feel very tired.  · Pain in the arm, shoulder, neck, or jaw.  · Feel weak, dizzy, or light-headed.  · An irregular heart rate, or your heart rate is greater than 100 beats per minute (bpm) before exercise.  These symptoms may represent a serious problem that is an emergency. Do not wait to see if the symptoms go away. Get medical help right away. Call your local emergency services (911 in the [place] Do not drive yourself to the hospital.   Summary  · Physical activity has many benefits, especially if you have heart disease.  · Before starting an activity program, talk with your health care provider about how often to be active and what type of activity is safe for you.  · Your physical activity plan may include moderate or vigorous aerobic activity, strengthening activities, and flexibility.  · Know what symptoms may be signs of a problem. Stop physical activity right away and call emergency services (911 in the [place] if you have any of these symptoms.  This information is not intended to replace advice given to you by your health care provider. Make sure you discuss any questions you have with your health care provider.  Document Revised: 01/09/2019 Document Reviewed: 01/09/2019  [employer] Patient Education © 2020 [employer]  BMI for Adults  What is BMI?  Body mass index (BMI) is a number that is calculated from a person's weight and height. BMI can help estimate how much of a person's weight is composed of fat. BMI does not measure body fat directly. Rather, it is an alternative to procedures that directly measure body fat, which can be difficult and expensive.  BMI can help identify people who may be at higher risk for certain medical problems.  What are BMI measurements used for?  BMI is used as a screening tool to  "identify possible weight problems. It helps determine whether a person is obese, overweight, a healthy weight, or underweight.  BMI is useful for:  · Identifying a weight problem that may be related to a medical condition or may increase the risk for medical problems.  · Promoting changes, such as changes in diet and exercise, to help reach a healthy weight. BMI screening can be repeated to see if these changes are working.  How is BMI calculated?  BMI involves measuring your weight in relation to your height. Both height and weight are measured, and the BMI is calculated from those numbers. This can be done either in English ([place] or metric measurements. Note that charts and online BMI calculators are available to help you find your BMI quickly and easily without having to do these calculations yourself.  To calculate your BMI in English ([place] measurements:    1. Measure your weight in pounds (lb).  2. Multiply the number of pounds by 703.  ? For example, for a person who weighs 180 lb, multiply that number by 703, which equals 126,540.  3. Measure your height in inches. Then multiply that number by itself to get a measurement called \"inches squared.\"  ? For example, for a person who is 70 inches tall, the \"inches squared\" measurement is 70 inches x 70 inches, which equals 4,900 inches squared.  4. Divide the total from step 2 (number of lb x 703) by the total from step 3 (inches squared): 126,540 ÷ 4,900 = 25.8. This is your BMI.  To calculate your BMI in metric measurements:  1. Measure your weight in kilograms (kg).  2. Measure your height in meters (m). Then multiply that number by itself to get a measurement called \"meters squared.\"  ? For example, for a person who is 1.75 m tall, the \"meters squared\" measurement is 1.75 m x 1.75 m, which is equal to 3.1 meters squared.  3. Divide the number of kilograms (your weight) by the meters squared number. In this example: 70 ÷ 3.1 = 22.6. This is your BMI.  What do " the results mean?  BMI charts are used to identify whether you are underweight, normal weight, overweight, or obese. The following guidelines will be used:  · Underweight: BMI less than 18.5.  · Normal weight: BMI between 18.5 and 24.9.  · Overweight: BMI between 25 and 29.9.  · Obese: BMI of 30 or above.  Keep these notes in mind:  · Weight includes both fat and muscle, so someone with a muscular build, such as an athlete, may have a BMI that is higher than 24.9. In cases like these, BMI is not an accurate measure of body fat.  · To determine if excess body fat is the cause of a BMI of 25 or higher, further assessments may need to be done by a health care provider.  · BMI is usually interpreted in the same way for men and women.  Where to find more information  For more information about BMI, including tools to quickly calculate your BMI, go to these websites:  · Centers for Disease Control and Prevention: www.cdc.gov  · American Heart Association: www.heart.org  · National Heart, Lung, and Blood [place]: www.nhlbi.nih.gov  Summary  · Body mass index (BMI) is a number that is calculated from a person's weight and height.  · BMI may help estimate how much of a person's weight is composed of fat. BMI can help identify those who may be at higher risk for certain medical problems.  · BMI can be measured using English measurements or metric measurements.  · BMI charts are used to identify whether you are underweight, normal weight, overweight, or obese.  This information is not intended to replace advice given to you by your health care provider. Make sure you discuss any questions you have with your health care provider.  Document Revised: 09/09/2020 Document Reviewed: 07/17/2020  [employer] Patient Education © 2020 [employer]  Calorie Counting for Weight Loss  Calories are units of energy. Your body needs a certain amount of calories from food to keep you going throughout the day. When you eat more calories than  your body needs, your body stores the extra calories as fat. When you eat fewer calories than your body needs, your body burns fat to get the energy it needs.  Calorie counting means keeping track of how many calories you eat and drink each day. Calorie counting can be helpful if you need to lose weight. If you make sure to eat fewer calories than your body needs, you should lose weight. Ask your health care provider what a healthy weight is for you.  For calorie counting to work, you will need to eat the right number of calories in a day in order to lose a healthy amount of weight per week. A dietitian can help you determine how many calories you need in a day and will give you suggestions on how to reach your calorie goal.  · A healthy amount of weight to lose per week is usually 1-2 lb (0.5-0.9 kg). This usually means that your daily calorie intake should be reduced by 500-750 calories.  · Eating 1,200 - 1,500 calories per day can help most women lose weight.  · Eating 1,500 - 1,800 calories per day can help most men lose weight.  What is my plan?  My goal is to have __________ calories per day.  If I have this many calories per day, I should lose around __________ pounds per week.  What do I need to know about calorie counting?  In order to meet your daily calorie goal, you will need to:  · Find out how many calories are in each food you would like to eat. Try to do this before you eat.  · Decide how much of the food you plan to eat.  · Write down what you ate and how many calories it had. Doing this is called keeping a food log.  To successfully lose weight, it is important to balance calorie counting with a healthy lifestyle that includes regular activity. Aim for 150 minutes of moderate exercise (such as walking) or 75 minutes of vigorous exercise (such as running) each week.  Where do I find calorie information?    The number of calories in a food can be found on a Nutrition Facts label. If a food does not  have a Nutrition Facts label, try to look up the calories online or ask your dietitian for help.  Remember that calories are listed per serving. If you choose to have more than one serving of a food, you will have to multiply the calories per serving by the amount of servings you plan to eat. For example, the label on a package of bread might say that a serving size is 1 slice and that there are 90 calories in a serving. If you eat 1 slice, you will have eaten 90 calories. If you eat 2 slices, you will have eaten 180 calories.  How do I keep a food log?  Immediately after each meal, record the following information in your food log:  · What you ate. Don't forget to include toppings, sauces, and other extras on the food.  · How much you ate. This can be measured in cups, ounces, or number of items.  · How many calories each food and drink had.  · The total number of calories in the meal.  Keep your food log near you, such as in a small notebook in your pocket, or use a mobile joey or website. Some programs will calculate calories for you and show you how many calories you have left for the day to meet your goal.  What are some calorie counting tips?    · Use your calories on foods and drinks that will fill you up and not leave you hungry:  ? Some examples of foods that fill you up are nuts and nut butters, vegetables, lean proteins, and high-fiber foods like whole grains. High-fiber foods are foods with more than 5 g fiber per serving.  ? Drinks such as sodas, specialty coffee drinks, alcohol, and juices have a lot of calories, yet do not fill you up.  · Eat nutritious foods and avoid empty calories. Empty calories are calories you get from foods or beverages that do not have many vitamins or protein, such as candy, sweets, and soda. It is better to have a nutritious high-calorie food (such as an avocado) than a food with few nutrients (such as a bag of chips).  · Know how many calories are in the foods you eat most  "often. This will help you calculate calorie counts faster.  · Pay attention to calories in drinks. Low-calorie drinks include water and unsweetened drinks.  · Pay attention to nutrition labels for \"low fat\" or \"fat free\" foods. These foods sometimes have the same amount of calories or more calories than the full fat versions. They also often have added sugar, starch, or salt, to make up for flavor that was removed with the fat.  · Find a way of tracking calories that works for you. Get creative. Try different apps or programs if writing down calories does not work for you.  What are some portion control tips?  · Know how many calories are in a serving. This will help you know how many servings of a certain food you can have.  · Use a measuring cup to measure serving sizes. You could also try weighing out portions on a kitchen scale. With time, you will be able to estimate serving sizes for some foods.  · Take some time to put servings of different foods on your favorite plates, bowls, and cups so you know what a serving looks like.  · Try not to eat straight from a bag or box. Doing this can lead to overeating. Put the amount you would like to eat in a cup or on a plate to make sure you are eating the right portion.  · Use smaller plates, glasses, and bowls to prevent overeating.  · Try not to multitask (for example, watch TV or use your computer) while eating. If it is time to eat, sit down at a table and enjoy your food. This will help you to know when you are full. It will also help you to be aware of what you are eating and how much you are eating.  What are tips for following this plan?  Reading food labels  · Check the calorie count compared to the serving size. The serving size may be smaller than what you are used to eating.  · Check the source of the calories. Make sure the food you are eating is high in vitamins and protein and low in saturated and trans fats.  Shopping  · Read nutrition labels while you " "shop. This will help you make healthy decisions before you decide to purchase your food.  · Make a grocery list and stick to it.  Cooking  · Try to cook your favorite foods in a healthier way. For example, try baking instead of frying.  · Use low-fat dairy products.  Meal planning  · Use more fruits and vegetables. Half of your plate should be fruits and vegetables.  · Include lean proteins like poultry and fish.  How do I count calories when eating out?  · Ask for smaller portion sizes.  · Consider sharing an entree and sides instead of getting your own entree.  · If you get your own entree, eat only half. Ask for a box at the beginning of your meal and put the rest of your entree in it so you are not tempted to eat it.  · If calories are listed on the menu, choose the lower calorie options.  · Choose dishes that include vegetables, fruits, whole grains, low-fat dairy products, and lean protein.  · Choose items that are boiled, broiled, grilled, or steamed. Stay away from items that are buttered, battered, fried, or served with cream sauce. Items labeled \"crispy\" are usually fried, unless stated otherwise.  · Choose water, low-fat milk, unsweetened iced tea, or other drinks without added sugar. If you want an alcoholic beverage, choose a lower calorie option such as a glass of wine or light beer.  · Ask for dressings, sauces, and syrups on the side. These are usually high in calories, so you should limit the amount you eat.  · If you want a salad, choose a garden salad and ask for grilled meats. Avoid extra toppings like harden, cheese, or fried items. Ask for the dressing on the side, or ask for olive oil and vinegar or lemon to use as dressing.  · Estimate how many servings of a food you are given. For example, a serving of cooked rice is ½ cup or about the size of half a baseball. Knowing serving sizes will help you be aware of how much food you are eating at restaurants. The list below tells you how big or small " some common portion sizes are based on everyday objects:  ? 1 oz--4 stacked dice.  ? 3 oz--1 deck of cards.  ? 1 tsp--1 die.  ? 1 Tbsp--½ a ping-pong ball.  ? 2 Tbsp--1 ping-pong ball.  ? ½ cup--½ baseball.  ? 1 cup--1 baseball.  Summary  · Calorie counting means keeping track of how many calories you eat and drink each day. If you eat fewer calories than your body needs, you should lose weight.  · A healthy amount of weight to lose per week is usually 1-2 lb (0.5-0.9 kg). This usually means reducing your daily calorie intake by 500-750 calories.  · The number of calories in a food can be found on a Nutrition Facts label. If a food does not have a Nutrition Facts label, try to look up the calories online or ask your dietitian for help.  · Use your calories on foods and drinks that will fill you up, and not on foods and drinks that will leave you hungry.  · Use smaller plates, glasses, and bowls to prevent overeating.  This information is not intended to replace advice given to you by your health care provider. Make sure you discuss any questions you have with your health care provider.  Document Revised: 09/06/2019 Document Reviewed: 11/17/2017  [employer]vier Patient Education © 2020 [employer]  Mediterranean Diet  A Mediterranean diet refers to food and lifestyle choices that are based on the traditions of countries located on the [place]. This way of eating has been shown to help prevent certain conditions and improve outcomes for people who have chronic diseases, like kidney disease and heart disease.  What are tips for following this plan?  Lifestyle  · Cook and eat meals together with your family, when possible.  · Drink enough fluid to keep your urine clear or pale yellow.  · Be physically active every day. This includes:  ? Aerobic exercise like running or swimming.  ? Leisure activities like gardening, walking, or housework.  · Get 7-8 hours of sleep each night.  · If recommended by your health care  provider, drink red wine in moderation. This means 1 glass a day for nonpregnant women and 2 glasses a day for men. A glass of wine equals 5 oz (150 mL).  Reading food labels    · Check the serving size of packaged foods. For foods such as rice and pasta, the serving size refers to the amount of cooked product, not dry.  · Check the total fat in packaged foods. Avoid foods that have saturated fat or trans fats.  · Check the ingredients list for added sugars, such as corn syrup.  Shopping  · At the grocery store, buy most of your food from the areas near the walls of the store. This includes:  ? Fresh fruits and vegetables (produce).  ? Grains, beans, nuts, and seeds. Some of these may be available in unpackaged forms or large amounts (in bulk).  ? Fresh seafood.  ? Poultry and eggs.  ? Low-fat dairy products.  · Buy whole ingredients instead of prepackaged foods.  · Buy fresh fruits and vegetables in-season from local [place] markets.  · Buy frozen fruits and vegetables in resealable bags.  · If you do not have access to quality fresh seafood, buy precooked frozen shrimp or canned fish, such as tuna, salmon, or sardines.  · Buy small amounts of raw or cooked vegetables, salads, or olives from the deli or salad bar at your store.  · Stock your pantry so you always have certain foods on hand, such as olive oil, canned tuna, canned tomatoes, rice, pasta, and beans.  Cooking  · Cook foods with extra-virgin olive oil instead of using butter or other vegetable oils.  · Have meat as a side dish, and have vegetables or grains as your main dish. This means having meat in small portions or adding small amounts of meat to foods like pasta or stew.  · Use beans or vegetables instead of meat in common dishes like chili or lasagna.  · [place] with different cooking methods. Try roasting or broiling vegetables instead of steaming or sautéeing them.  · Add frozen vegetables to soups, stews, pasta, or rice.  · Add nuts or seeds  for added healthy fat at each meal. You can add these to yogurt, salads, or vegetable dishes.  · Marinate fish or vegetables using olive oil, lemon juice, garlic, and fresh herbs.  Meal planning    · Plan to eat 1 vegetarian meal one day each week. Try to work up to 2 vegetarian meals, if possible.  · Eat seafood 2 or more times a week.  · Have healthy snacks readily available, such as:  ? Vegetable sticks with hummus.  ? Greek yogurt.  ? Fruit and nut trail mix.  · Eat balanced meals throughout the week. This includes:  ? Fruit: 2-3 servings a day  ? Vegetables: 4-5 servings a day  ? Low-fat dairy: 2 servings a day  ? Fish, poultry, or lean meat: 1 serving a day  ? Beans and legumes: 2 or more servings a week  ? Nuts and seeds: 1-2 servings a day  ? Whole grains: 6-8 servings a day  ? Extra-virgin olive oil: 3-4 servings a day  · Limit red meat and sweets to only a few servings a month  What are my food choices?  · Mediterranean diet  ? Recommended  § Grains: Whole-grain pasta. Brown rice. Bulgar wheat. Polenta. Couscous. Whole-wheat bread. Oatmeal. Quinoa.  § Vegetables: Artichokes. Beets. Broccoli. Cabbage. Carrots. Eggplant. Green beans. Chard. Kale. Spinach. Onions. Leeks. Peas. Squash. Tomatoes. Peppers. Radishes.  § Fruits: Apples. Apricots. Avocado. Berries. Bananas. Cherries. Dates. Figs. Grapes. Guillermo. Melon. Oranges. Peaches. Plums. Pomegranate.  § Meats and other protein foods: Beans. Almonds. Sunflower seeds. Pine nuts. Peanuts. Cod. [place]. Scallops. Shrimp. Tuna. Tilapia. Clams. Oysters. Eggs.  § Dairy: Low-fat milk. Cheese. Greek yogurt.  § Beverages: Water. Red wine. Herbal tea.  § Fats and oils: Extra virgin olive oil. Avocado oil. Grape seed oil.  § Sweets and desserts: Greek yogurt with honey. Baked apples. Poached pears. Trail mix.  § Seasoning and other foods: Basil. Cilantro. Coriander. Cumin. Mint. Parsley. Alfonso. Rosemary. Tarragon. Garlic. Oregano. Thyme. Pepper. Balsalmic vinegar. Tahini.  Hummus. Tomato sauce. Olives. Mushrooms.  ? Limit these  § Grains: Prepackaged pasta or rice dishes. Prepackaged cereal with added sugar.  § Vegetables: Deep fried potatoes (french fries).  § Fruits: Fruit canned in syrup.  § Meats and other protein foods: Beef. Pork. Lamb. Poultry with skin. Hot dogs. Charles.  § Dairy: Ice cream. Sour cream. Whole milk.  § Beverages: Juice. Sugar-sweetened soft drinks. Beer. Liquor and spirits.  § Fats and oils: Butter. Canola oil. Vegetable oil. Beef fat (tallow). Lard.  § Sweets and desserts: Cookies. Cakes. Pies. Candy.  § Seasoning and other foods: Mayonnaise. Premade sauces and marinades.  The items listed may not be a complete list. Talk with your dietitian about what dietary choices are right for you.  Summary  · The Mediterranean diet includes both food and lifestyle choices.  · Eat a variety of fresh fruits and vegetables, beans, nuts, seeds, and whole grains.  · Limit the amount of red meat and sweets that you eat.  · Talk with your health care provider about whether it is safe for you to drink red wine in moderation. This means 1 glass a day for nonpregnant women and 2 glasses a day for men. A glass of wine equals 5 oz (150 mL).  This information is not intended to replace advice given to you by your health care provider. Make sure you discuss any questions you have with your health care provider.  Document Revised: 08/17/2017 Document Reviewed: 08/10/2017  Elsevier Patient Education © 2020 [employer] Inc[employer]

## 2021-01-11 NOTE — PROGRESS NOTES
Chief Complaint   Patient presents with   • Follow-up     Cardiac mangement   • Palpitations     Has occasional palpitations, she notices more if late with medications but resioves after meds.   • Labs     Labs on chart from March 2020.  Also had labs in October 2021 per PCp.   • Med Refill     Needs refills on all cardiac medications 90 day supply to [facility] pharmacy       Subjective       Colleen Javier is a 40 y.o. female  with intermittent palpitations diagnosed with multiple PVCs by EKG in 2015. Holter confirmed PVC and PAC. TSH was normal. She was treated with Coreg and palpitations improved.  She hadn't been seen since 2015 and was referred back July 2018 for palpitations. Echo and holter monitor were ordered with EF 40-45%, diastolic dysfunction, and mild MR.  Holter showed NSR with no arrhythmias, MCOT was then advised as she continued to have palpitations.  MCOT done the end of July 2018 showed NSR with no arrhythmias noted. Lisinopril added. Echo repeated 7/2019 showing EF improved to 65%.     Today she comes to the office for follow-up visit.  She denies new or worsening cardiac symptoms.  As long as she takes Coreg at scheduled time she does not have any palpitations.  If she forgets to take a dose she does develop symptoms.  Otherwise, she is maintaining her normal daily activity without issues.    Cardiac History:    Past Surgical History:   Procedure Laterality Date   • CONVERTED (HISTORICAL) HOLTER  01/16/2015    Few PAC's and PVC's, palpitations felt with PVC, baseline sinus, no SVT or VT   • CONVERTED (HISTORICAL) HOLTER  07/16/2018    Baseline NSR with no arrythmia, less than 1% PVC   • ECHO - CONVERTED  07/02/2018    @[facility]:  Limited study, EF 40-45%, mild MR, RSVP 28mmHg   • ECHO - CONVERTED  07/22/2019    ([facility]) EF 65%, trace MR   • OTHER SURGICAL HISTORY  07/27/2018    MCOT:  Baseline NSR with no arrythmia noted       Current Outpatient Medications   Medication Sig Dispense Refill   •  carvedilol (COREG) 3.125 MG tablet Take 1 tablet by mouth Daily. 90 tablet 3   • cetirizine (zyrTEC) 10 MG tablet Take 10 mg by mouth Daily.     • hydroCHLOROthiazide (MICROZIDE) 12.5 MG capsule Take 1 capsule by mouth Daily As Needed (use as needed for edema). 90 capsule 3   • levonorgestrel-ethinyl estradiol (SRONYX) 0.1-20 MG-MCG per tablet Take 1 tablet by mouth Daily.     • lisinopril (PRINIVIL,ZESTRIL) 2.5 MG tablet Take 1 tablet by mouth Daily. 90 tablet 3   • Multiple Vitamins-Minerals (MULTIVITAMIN ADULTS PO) Take  by mouth Daily.       No current facility-administered medications for this visit.        Patient has no known allergies.    Past Medical History:   Diagnosis Date   • Palpitations        Social History     Socioeconomic History   • Marital status:      Spouse name: Not on file   • Number of children: Not on file   • Years of education: Not on file   • Highest education level: Not on file   Tobacco Use   • Smoking status: Never Smoker   • Smokeless tobacco: Never Used   Substance and Sexual Activity   • Alcohol use: No   • Drug use: No       Family History   Problem Relation Age of Onset   • Hypertension Mother    • Heart attack Father 40        multiple   • Heart disease Father         multiple stents   • Hypertension Sister         2 sisters with HTN       Review of Systems   Constitution: Negative for decreased appetite, diaphoresis and malaise/fatigue.   HENT: Negative for nosebleeds.    Eyes: Negative for blurred vision.   Cardiovascular: Positive for palpitations (remain controlled with medication ). Negative for chest pain, claudication, cyanosis, dyspnea on exertion, irregular heartbeat, leg swelling, near-syncope, orthopnea, paroxysmal nocturnal dyspnea and syncope.   Respiratory: Negative for shortness of breath.    Endocrine: Negative for cold intolerance and heat intolerance.   Hematologic/Lymphatic: Does not bruise/bleed easily.   Skin: Negative for rash.   Musculoskeletal:  "Negative for myalgias.   Gastrointestinal: Negative for heartburn, melena and nausea.   Genitourinary: Negative for dysuria and hematuria.   Neurological: Negative for dizziness and light-headedness.   Psychiatric/Behavioral: The patient does not have insomnia and is not nervous/anxious.         BP Readings from Last 5 Encounters:   01/11/21 128/70   01/13/20 110/70   07/01/19 108/72   07/02/18 98/62       Wt Readings from Last 5 Encounters:   01/11/21 94.7 kg (208 lb 12.8 oz)   01/13/20 95 kg (209 lb 6.4 oz)   07/01/19 98 kg (216 lb)   07/02/18 90.3 kg (199 lb)         Objective      Labs 2/18/2020: WBC 6.1, RBC 3.99, hemoglobin 12.1, hematocrit 37.1, platelets 255, sodium 137, potassium 4, chloride 107, CO2 27, glucose 92, BUN 13, creatinine 0.7, total protein 7.5, albumin 3.8, calcium 8.7, total bili 0.4, ALT 30, ALP 66, mag 2.1, triglycerides 92, cholesterol 172, HDL 53, , TSH 3.96    /70 (BP Location: Left arm)   Pulse 76   Temp 98.2 °F (36.8 °C)   Ht 175.3 cm (69\")   Wt 94.7 kg (208 lb 12.8 oz)   BMI 30.83 kg/m²     Vitals signs and nursing note reviewed.   Eyes:      Pupils: Pupils are equal, round, and reactive to light.   HENT:      Head: Normocephalic.   Neck:      Musculoskeletal: Normal range of motion.   Pulmonary:      Breath sounds: Normal breath sounds.   Cardiovascular:      Normal rate. Regular rhythm.   Edema:     Peripheral edema absent.   Abdominal:      General: Bowel sounds are normal.      Palpations: Abdomen is soft.   Musculoskeletal: Normal range of motion.   Skin:     General: Skin is warm.   Neurological:      Mental Status: Alert and oriented to person, place, and time.          Procedures: none today            Assessment/Plan     Diagnoses and all orders for this visit:    1. PVC (premature ventricular contraction) (Primary)  -     carvedilol (COREG) 3.125 MG tablet; Take 1 tablet by mouth Daily.  Dispense: 90 tablet; Refill: 3    2. Palpitations  -     carvedilol " (COREG) 3.125 MG tablet; Take 1 tablet by mouth Daily.  Dispense: 90 tablet; Refill: 3    3. Medication management    4. Edema leg  -     hydroCHLOROthiazide (MICROZIDE) 12.5 MG capsule; Take 1 capsule by mouth Daily As Needed (use as needed for edema).  Dispense: 90 capsule; Refill: 3    5. Asymptomatic LV dysfunction  -     lisinopril (PRINIVIL,ZESTRIL) 2.5 MG tablet; Take 1 tablet by mouth Daily.  Dispense: 90 tablet; Refill: 3    PVCs/palpitations-palpitations remain controlled.  Continue same dose carvedilol and refill faxed to her pharmacy.  Continue conservative management in avoiding caffeine and maintaining good hydration.    Medication management-BP is normal.  Continue low-dose lisinopril.  She takes hydrochlorothiazide about 5 days a week.  Refills given to continue medications as currently taking.  We also discussed the benefit of diet, exercise, and weight loss.    Leg edema-admits to mild swelling with prolonged standing.  This is not a new or worsening symptom.  Consider compression stockings as needed.    Patient's Body mass index is 30.83 kg/m². BMI is above normal parameters. Recommendations include: nutrition counseling.  Handout information on Mediterranean diet, diet for weight loss, BMI, and exercise were given to her.  Goal BMI around 25 encouraged.    Cholesterol management-labs from a year ago showed slight increase in LDL.  She continues dietary management.  According to patient she had labs in October and results were good.  Please forward copy of next lab report.    We will continue to see her on an annual basis unless she develops new or worsening symptoms.  Please call sooner for any cardiac concerns.              Electronically signed by MAURI Norris,  January 11, 2021 08:46 EST

## 2021-05-07 ENCOUNTER — APPOINTMENT (OUTPATIENT)
Dept: WOMENS IMAGING | Facility: HOSPITAL | Age: 41
End: 2021-05-07

## 2021-05-07 PROCEDURE — 77063 BREAST TOMOSYNTHESIS BI: CPT | Performed by: RADIOLOGY

## 2021-05-07 PROCEDURE — 77067 SCR MAMMO BI INCL CAD: CPT | Performed by: RADIOLOGY

## 2021-08-02 ENCOUNTER — TELEPHONE (OUTPATIENT)
Dept: CARDIOLOGY | Facility: CLINIC | Age: 41
End: 2021-08-02

## 2021-08-02 NOTE — TELEPHONE ENCOUNTER
Received a call from patient stating that she has been having to increase HCTZ to start taking it daily, her feet have continued to swell even after taking the water pill daily.

## 2021-08-09 RX ORDER — FUROSEMIDE 20 MG/1
20 TABLET ORAL DAILY
Qty: 90 TABLET | Refills: 3 | Status: SHIPPED | OUTPATIENT
Start: 2021-08-09 | End: 2022-01-11 | Stop reason: SDUPTHER

## 2022-01-11 ENCOUNTER — OFFICE VISIT (OUTPATIENT)
Dept: CARDIOLOGY | Facility: CLINIC | Age: 42
End: 2022-01-11

## 2022-01-11 VITALS
WEIGHT: 219.6 LBS | BODY MASS INDEX: 32.53 KG/M2 | HEART RATE: 73 BPM | DIASTOLIC BLOOD PRESSURE: 84 MMHG | TEMPERATURE: 98.3 F | HEIGHT: 69 IN | SYSTOLIC BLOOD PRESSURE: 126 MMHG

## 2022-01-11 DIAGNOSIS — R00.2 PALPITATIONS: ICD-10-CM

## 2022-01-11 DIAGNOSIS — E66.2 CLASS 1 OBESITY WITH ALVEOLAR HYPOVENTILATION, SERIOUS COMORBIDITY, AND BODY MASS INDEX (BMI) OF 30.0 TO 30.9 IN ADULT: ICD-10-CM

## 2022-01-11 DIAGNOSIS — I49.3 PVC (PREMATURE VENTRICULAR CONTRACTION): ICD-10-CM

## 2022-01-11 DIAGNOSIS — I10 PRIMARY HYPERTENSION: Primary | ICD-10-CM

## 2022-01-11 DIAGNOSIS — I51.9 ASYMPTOMATIC LV DYSFUNCTION: ICD-10-CM

## 2022-01-11 DIAGNOSIS — R60.0 EDEMA LEG: ICD-10-CM

## 2022-01-11 PROCEDURE — 99214 OFFICE O/P EST MOD 30 MIN: CPT | Performed by: NURSE PRACTITIONER

## 2022-01-11 RX ORDER — LISINOPRIL 2.5 MG/1
2.5 TABLET ORAL DAILY
Qty: 90 TABLET | Refills: 3 | Status: SHIPPED | OUTPATIENT
Start: 2022-01-11 | End: 2023-01-03

## 2022-01-11 RX ORDER — FUROSEMIDE 20 MG/1
20 TABLET ORAL DAILY
Qty: 90 TABLET | Refills: 3 | Status: SHIPPED | OUTPATIENT
Start: 2022-01-11

## 2022-01-11 RX ORDER — CARVEDILOL 3.12 MG/1
3.12 TABLET ORAL DAILY
Qty: 90 TABLET | Refills: 3 | Status: SHIPPED | OUTPATIENT
Start: 2022-01-11 | End: 2023-04-05

## 2022-01-11 NOTE — PROGRESS NOTES
Chief Complaint   Patient presents with   • Follow-up     Pt is here for cardiac follow up.  She denies CP, SOB, dizziness or palpitations.  She does not take a daily ASA.  She is trying to lose wt, using low calorie diet.  Her goal is less then 1400.   • Med Refill     Pt request 90 day refills to be sent to [facility] in [place].   • Lab Work     Pt reports that her last labs were with her PCP about 3 months ago.       Cardiac Complaints  none      Subjective   Colleen Javier is a 41 y.o. female with intermittent palpitations diagnosed with multiple PVCs by EKG in 2015. Holter confirmed PVC and PAC. TSH was normal. She was treated with Coreg and palpitations improved.  She hadn't been seen since 2015 and was referred back July 2018 for palpitations. Echo and holter monitor were ordered with EF 40-45%, diastolic dysfunction, and mild MR.  Holter showed NSR with no arrhythmias, MCOT was then advised as she continued to have palpitations.  MCOT done the end of July 2018 showed NSR with no arrhythmias noted. Lisinopril added. Echo repeated 7/2019 showing EF improved to 65%.     She returns today for follow up doing well. No CP, SOA, dizziness, or palpitations noted. She is actively trying to lose weight and has limited her caloric intake. Labs she admits were done 3 months ago, no current available. Refills requested.       Cardiac History  Past Surgical History:   Procedure Laterality Date   • CONVERTED (HISTORICAL) HOLTER  01/16/2015    Few PAC's and PVC's, palpitations felt with PVC, baseline sinus, no SVT or VT   • CONVERTED (HISTORICAL) HOLTER  07/16/2018    Baseline NSR with no arrythmia, less than 1% PVC   • ECHO - CONVERTED  07/02/2018    @[facility]:  Limited study, EF 40-45%, mild MR, RSVP 28mmHg   • ECHO - CONVERTED  07/22/2019    ([facility]) EF 65%, trace MR   • OTHER SURGICAL HISTORY  07/27/2018    MCOT:  Baseline NSR with no arrythmia noted       Current Outpatient Medications   Medication Sig Dispense Refill   •  carvedilol (COREG) 3.125 MG tablet Take 1 tablet by mouth Daily. 90 tablet 3   • cetirizine (zyrTEC) 10 MG tablet Take 10 mg by mouth Daily.     • furosemide (LASIX) 20 MG tablet Take 1 tablet by mouth Daily. 90 tablet 3   • Levonorgestrel-Ethinyl Estrad (LARISSIA PO) Take 1 tablet by mouth Daily.     • lisinopril (PRINIVIL,ZESTRIL) 2.5 MG tablet Take 1 tablet by mouth Daily. 90 tablet 3   • Multiple Vitamins-Minerals (MULTIVITAMIN ADULTS PO) Take  by mouth Daily.       No current facility-administered medications for this visit.       Patient has no known allergies.    Past Medical History:   Diagnosis Date   • Palpitations        Social History     Socioeconomic History   • Marital status:    Tobacco Use   • Smoking status: Never Smoker   • Smokeless tobacco: Never Used   Vaping Use   • Vaping Use: Never used   Substance and Sexual Activity   • Alcohol use: No   • Drug use: No       Family History   Problem Relation Age of Onset   • Hypertension Mother    • Heart attack Father 40        multiple   • Heart disease Father         multiple stents   • Hypertension Sister         2 sisters with HTN       Review of Systems   Constitutional: Negative for malaise/fatigue and night sweats.   Cardiovascular: Negative for chest pain, claudication, dyspnea on exertion, irregular heartbeat, leg swelling, near-syncope, orthopnea, palpitations and syncope.   Respiratory: Negative for cough, shortness of breath and wheezing.    Musculoskeletal: Negative for back pain, joint pain and stiffness.   Gastrointestinal: Negative for anorexia, heartburn, melena, nausea and vomiting.   Genitourinary: Negative for dysuria, hematuria, hesitancy and nocturia.   Neurological: Negative for dizziness, headaches and light-headedness.   Psychiatric/Behavioral: Negative for depression and memory loss. The patient is not nervous/anxious.            Objective     /84 (BP Location: Left arm, Patient Position: Sitting)   Pulse 73    "Temp 98.3 °F (36.8 °C)   Ht 175.3 cm (69\")   Wt 99.6 kg (219 lb 9.6 oz)   BMI 32.43 kg/m²     Constitutional:       Appearance: Not in distress.   Eyes:      Pupils: Pupils are equal, round, and reactive to light.   HENT:      Nose: Nose normal.   Pulmonary:      Effort: Pulmonary effort is normal.      Breath sounds: Normal breath sounds.   Cardiovascular:      PMI at left midclavicular line. Normal rate. Regular rhythm.      Murmurs: There is a systolic murmur.   Abdominal:      Palpations: Abdomen is soft.   Musculoskeletal: Normal range of motion.      Cervical back: Normal range of motion and neck supple. Skin:     General: Skin is warm and dry.   Neurological:      Mental Status: Oriented to person, place and time.         Procedures    Assessment/Plan     HTN: Blood pressure stable today.  No changes to current lisinopril or coreg therapy will be advised. Limited sodium advised.     Palpitations:  Well managed on current coreg therapy.  Limited caffeine intake advised with adequate fluid intake advised.     LV function:  Most recent echo showed LV function stable at 65%. No new workup recommended as she is asymptomatic.     Leg edema:  Improved slightly with use of lasix. Limited sodium intake advised. Labs she admits labs have been done with your office. Can I have a copy for review?    Refills requested.    BMI noted at 32.43, good cardiac diet with limited carbs, calories, and activity as tolerated advised.     1 year follow up advised or sooner if needed.         Problems Addressed this Visit        Cardiac and Vasculature    PVC (premature ventricular contraction)    Relevant Medications    carvedilol (COREG) 3.125 MG tablet    Palpitations    Relevant Medications    carvedilol (COREG) 3.125 MG tablet       Symptoms and Signs    Edema leg      Other Visit Diagnoses     Primary hypertension    -  Primary    Relevant Medications    lisinopril (PRINIVIL,ZESTRIL) 2.5 MG tablet    furosemide (LASIX) 20 MG " tablet    carvedilol (COREG) 3.125 MG tablet    Asymptomatic LV dysfunction        Relevant Medications    lisinopril (PRINIVIL,ZESTRIL) 2.5 MG tablet    carvedilol (COREG) 3.125 MG tablet    Class 1 obesity with alveolar hypoventilation, serious comorbidity, and body mass index (BMI) of 30.0 to 30.9 in adult ([facility])          Diagnoses       Codes Comments    Primary hypertension    -  Primary ICD-10-CM: I10  ICD-9-CM: 401.9     Asymptomatic LV dysfunction     ICD-10-CM: I51.9  ICD-9-CM: 429.9     Palpitations     ICD-10-CM: R00.2  ICD-9-CM: 785.1     PVC (premature ventricular contraction)     ICD-10-CM: I49.3  ICD-9-CM: 427.69     Edema leg     ICD-10-CM: R60.0  ICD-9-CM: 782.3     Class 1 obesity with alveolar hypoventilation, serious comorbidity, and body mass index (BMI) of 30.0 to 30.9 in adult ([facility])     ICD-10-CM: E66.2, Z68.30  ICD-9-CM: 278.03, V85.30           Patient's Body mass index is 32.43 kg/m². indicating that she is obese. Good cardiac diet with limited carbs, calories, and activity as tolerated advised.           Electronically signed by MAURI Argueta January 11, 2022 11:23 EST

## 2022-05-09 ENCOUNTER — APPOINTMENT (OUTPATIENT)
Dept: WOMENS IMAGING | Facility: HOSPITAL | Age: 42
End: 2022-05-09

## 2022-05-09 PROCEDURE — 77067 SCR MAMMO BI INCL CAD: CPT | Performed by: RADIOLOGY

## 2022-05-09 PROCEDURE — 77063 BREAST TOMOSYNTHESIS BI: CPT | Performed by: RADIOLOGY

## 2023-01-02 DIAGNOSIS — I51.9 ASYMPTOMATIC LV DYSFUNCTION: ICD-10-CM

## 2023-01-03 RX ORDER — LISINOPRIL 2.5 MG/1
TABLET ORAL
Qty: 90 TABLET | Refills: 3 | Status: SHIPPED | OUTPATIENT
Start: 2023-01-03

## 2023-04-03 DIAGNOSIS — I49.3 PVC (PREMATURE VENTRICULAR CONTRACTION): ICD-10-CM

## 2023-04-03 DIAGNOSIS — R00.2 PALPITATIONS: ICD-10-CM

## 2023-04-05 RX ORDER — CARVEDILOL 3.12 MG/1
TABLET ORAL
Qty: 90 TABLET | Refills: 3 | Status: SHIPPED | OUTPATIENT
Start: 2023-04-05

## 2023-04-05 NOTE — TELEPHONE ENCOUNTER
PATIENT CALLING IN TO SCHEDULE AN APPOINTMENT AND GET REFILLS. PATIENT NEEDS APPOINTMENT AS LATE IN THE DAY AS POSSIBLE DUE TO WORK SCHEDULE. PATIENT HAS APPROXIMATELY 1 WEEK LEFT OF THIS MEDICATION & WOULD LIKER A 90 DAY SUPPLY.

## 2023-10-02 NOTE — ADDENDUM NOTE
Addended by: MARLEY SABILLON on: 7/9/2019 04:34 PM     Modules accepted: Orders     Deep Sutures: 5-0 Monocryl

## 2024-06-04 ENCOUNTER — TELEPHONE (OUTPATIENT)
Dept: CARDIOLOGY | Facility: CLINIC | Age: 44
End: 2024-06-04
Payer: COMMERCIAL

## 2024-06-04 NOTE — TELEPHONE ENCOUNTER
Faxed over a medical record request to inquire if patient had external labs completed at their office and if so to fax a copy.

## 2024-08-05 ENCOUNTER — OFFICE VISIT (OUTPATIENT)
Dept: CARDIOLOGY | Facility: CLINIC | Age: 44
End: 2024-08-05
Payer: COMMERCIAL

## 2024-08-05 VITALS
WEIGHT: 217.4 LBS | BODY MASS INDEX: 32.2 KG/M2 | DIASTOLIC BLOOD PRESSURE: 60 MMHG | HEART RATE: 70 BPM | SYSTOLIC BLOOD PRESSURE: 118 MMHG | HEIGHT: 69 IN

## 2024-08-05 DIAGNOSIS — I49.3 PVC (PREMATURE VENTRICULAR CONTRACTION): Primary | ICD-10-CM

## 2024-08-05 DIAGNOSIS — R00.2 PALPITATIONS: ICD-10-CM

## 2024-08-05 DIAGNOSIS — R06.02 SOB (SHORTNESS OF BREATH): ICD-10-CM

## 2024-08-05 DIAGNOSIS — R60.0 EDEMA LEG: ICD-10-CM

## 2024-08-05 PROCEDURE — 99213 OFFICE O/P EST LOW 20 MIN: CPT | Performed by: NURSE PRACTITIONER

## 2024-08-05 NOTE — PROGRESS NOTES
Chief Complaint   Patient presents with    Follow-up     Cardiac management    Shortness of Breath     Patient  reports she feels as if she can not get a good deep breath in . This is most often in evening hours     LABS     Current labs October 2023 . CHO- 168  TRI-  64  HDL- 56   LDL- 108    Med Refill     No refills needed today. PCP manages refills     Subjective       Colleen Javier is a 44 y.o. femalewith intermittent palpitations diagnosed with multiple PVCs by EKG in 2015. Holter confirmed PVC and PAC. TSH was normal. She was treated with Coreg and palpitations improved.  She hadn't been seen since 2015 and was referred back July 2018 for palpitations. Echo and holter monitor were ordered with EF 40-45%, diastolic dysfunction, and mild MR.  Holter showed NSR with no arrhythmias, MCOT was then advised as she continued to have palpitations.  MCOT done the end of July 2018 showed NSR with no arrhythmias noted. Lisinopril added. Echo repeated 7/2019 showing EF improved to 65%. Last follow-up 1/2022 she was doing well with no cardiac complaints.     She returns today for yearly follow up. No acte complaints. Occasionally notes mild SOB, typically occurs in the evening and occurs at rest. Feels like she cannot take good, deep breath. No cough, wheeze, no worsening leg swelling. No significant palpitations. 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 Weight is up from 190 last year. Labs October 2023 , , Tri 64, HDL 56.         Cardiac History:    Past Surgical History:   Procedure Laterality Date    CONVERTED (HISTORICAL) HOLTER  01/16/2015    Few PAC's and PVC's, palpitations felt with PVC, baseline sinus, no SVT or VT    CONVERTED (HISTORICAL) HOLTER  07/16/2018    Baseline NSR with no arrythmia, less than 1% PVC    ECHO - CONVERTED  07/02/2018    @[facility]:  Limited study, EF 40-45%, mild MR, RSVP 28mmHg    ECHO - CONVERTED  07/22/2019    ([facility]) EF 65%, trace  MR    OTHER SURGICAL HISTORY  07/27/2018    MCOT:  Baseline NSR with no arrythmia noted     Current Outpatient Medications   Medication Sig Dispense Refill    carvedilol (COREG) 3.125 MG tablet TAKE 1 TABLET BY MOUTH EVERY DAY 90 tablet 3    cetirizine (zyrTEC) 10 MG tablet Take 1 tablet by mouth Daily.      ferrous sulfate 325 (65 FE) MG tablet Take 1 tablet by mouth Daily With Breakfast.      furosemide (LASIX) 20 MG tablet Take 1 tablet by mouth Daily. 90 tablet 3    Levonorgestrel-Ethinyl Estrad (LARISSIA PO) Take 1 tablet by mouth Daily.      lisinopril (PRINIVIL,ZESTRIL) 2.5 MG tablet Take 1 tablet by mouth Daily. 90 tablet 3    Multiple Vitamins-Minerals (MULTIVITAMIN ADULTS PO) Take  by mouth Daily.       No current facility-administered medications for this visit.     Patient has no known allergies.    Past Medical History:   Diagnosis Date    Palpitations      Social History     Socioeconomic History    Marital status:    Tobacco Use    Smoking status: Never    Smokeless tobacco: Never   Vaping Use    Vaping status: Never Used   Substance and Sexual Activity    Alcohol use: No    Drug use: No    Sexual activity: Defer     Family History   Problem Relation Age of Onset    Hypertension Mother     Heart attack Father 40        multiple    Heart disease Father         multiple stents    Hypertension Sister         2 sisters with HTN     Review of Systems   Constitutional: Positive for weight gain (+27). Negative for decreased appetite and malaise/fatigue.   HENT: Negative.     Eyes:  Negative for blurred vision.   Cardiovascular:  Negative for chest pain, dyspnea on exertion, leg swelling, palpitations and syncope.   Respiratory:  Positive for shortness of breath. Negative for sleep disturbances due to breathing.    Endocrine: Negative.    Hematologic/Lymphatic: Negative for bleeding problem. Does not bruise/bleed easily.   Skin: Negative.    Musculoskeletal:  Negative for falls and myalgias.  "  Gastrointestinal:  Negative for abdominal pain, heartburn and melena.   Genitourinary:  Negative for hematuria.   Neurological:  Negative for dizziness and light-headedness.   Psychiatric/Behavioral:  Negative for altered mental status.    Allergic/Immunologic: Negative.       Objective     /60 (BP Location: Left arm, Patient Position: Sitting)   Pulse 70   Ht 175.3 cm (69\")   Wt 98.6 kg (217 lb 6.4 oz)   BMI 32.10 kg/m²     Vitals and nursing note reviewed.   Constitutional:       General: Not in acute distress.     Appearance: Well-developed. Not diaphoretic.   Eyes:      Pupils: Pupils are equal, round, and reactive to light.   HENT:      Head: Normocephalic.   Pulmonary:      Effort: Pulmonary effort is normal. No respiratory distress.      Breath sounds: Normal breath sounds.   Cardiovascular:      Normal rate. Regular rhythm.   Pulses:     Intact distal pulses.   Edema:     Peripheral edema absent.   Abdominal:      General: Bowel sounds are normal.      Palpations: Abdomen is soft.   Musculoskeletal: Normal range of motion.      Cervical back: Normal range of motion. Skin:     General: Skin is warm and dry.   Neurological:      Mental Status: Alert and oriented to person, place, and time.        Procedures          Problem List Items Addressed This Visit          Cardiac and Vasculature    PVC (premature ventricular contraction) - Primary    Palpitations       Symptoms and Signs    Edema leg     Other Visit Diagnoses       SOB (shortness of breath)               Palpitations/PVC  -well controlled with carvedilol  -continue same plan     SOB  -lungs clear bilaterally  -no alarm findings  -recommend deep breathing exercises  -with new job requiring long hours at computer, advised to take 5 minute walk with stretching every 1-2 hours  -increase protein, reduce cho for weight management     Leg swelling  -well controlled with lasix PRN     She appears stable. No new testing ordered. FU 1 year or " sooner if needed.     BMI is >= 30 and <35. (Class 1 Obesity). The following options were offered after discussion;: nutrition counseling/recommendations                       Electronically signed by MAURI Eid,  August 7, 2024 11:30 EDT

## 2025-08-05 ENCOUNTER — OFFICE VISIT (OUTPATIENT)
Dept: CARDIOLOGY | Facility: CLINIC | Age: 45
End: 2025-08-05
Payer: COMMERCIAL

## 2025-08-05 VITALS
BODY MASS INDEX: 34.66 KG/M2 | HEIGHT: 69 IN | HEART RATE: 68 BPM | SYSTOLIC BLOOD PRESSURE: 126 MMHG | DIASTOLIC BLOOD PRESSURE: 90 MMHG | WEIGHT: 234 LBS

## 2025-08-05 DIAGNOSIS — R60.0 EDEMA LEG: ICD-10-CM

## 2025-08-05 DIAGNOSIS — R00.2 PALPITATIONS: ICD-10-CM

## 2025-08-05 DIAGNOSIS — I51.9 ASYMPTOMATIC LV DYSFUNCTION: ICD-10-CM

## 2025-08-05 DIAGNOSIS — I49.3 PVC (PREMATURE VENTRICULAR CONTRACTION): ICD-10-CM

## 2025-08-05 PROCEDURE — 99214 OFFICE O/P EST MOD 30 MIN: CPT | Performed by: NURSE PRACTITIONER

## 2025-08-05 RX ORDER — LISINOPRIL 2.5 MG/1
2.5 TABLET ORAL DAILY
Qty: 90 TABLET | Refills: 3 | Status: SHIPPED | OUTPATIENT
Start: 2025-08-05

## 2025-08-05 RX ORDER — CARVEDILOL 3.12 MG/1
3.12 TABLET ORAL DAILY
Qty: 90 TABLET | Refills: 3 | Status: SHIPPED | OUTPATIENT
Start: 2025-08-05

## 2025-08-05 RX ORDER — FUROSEMIDE 20 MG/1
20 TABLET ORAL DAILY
Qty: 90 TABLET | Refills: 3 | Status: SHIPPED | OUTPATIENT
Start: 2025-08-05